# Patient Record
Sex: FEMALE | Race: BLACK OR AFRICAN AMERICAN | NOT HISPANIC OR LATINO | URBAN - METROPOLITAN AREA
[De-identification: names, ages, dates, MRNs, and addresses within clinical notes are randomized per-mention and may not be internally consistent; named-entity substitution may affect disease eponyms.]

---

## 2018-04-28 ENCOUNTER — EMERGENCY (EMERGENCY)
Facility: HOSPITAL | Age: 43
LOS: 1 days | Discharge: HOME | End: 2018-04-28
Attending: EMERGENCY MEDICINE
Payer: COMMERCIAL

## 2018-04-28 VITALS
WEIGHT: 139.99 LBS | DIASTOLIC BLOOD PRESSURE: 84 MMHG | SYSTOLIC BLOOD PRESSURE: 122 MMHG | RESPIRATION RATE: 19 BRPM | HEIGHT: 63 IN | HEART RATE: 115 BPM | OXYGEN SATURATION: 98 % | TEMPERATURE: 99 F

## 2018-04-28 PROCEDURE — 99283 EMERGENCY DEPT VISIT LOW MDM: CPT | Mod: 25

## 2018-04-28 PROCEDURE — 99285 EMERGENCY DEPT VISIT HI MDM: CPT

## 2018-04-28 PROCEDURE — 99053 MED SERV 10PM-8AM 24 HR FAC: CPT

## 2018-04-28 PROCEDURE — 82962 GLUCOSE BLOOD TEST: CPT

## 2018-04-28 RX ORDER — MIRTAZAPINE 45 MG/1
0 TABLET, ORALLY DISINTEGRATING ORAL
Qty: 0 | Refills: 0 | COMMUNITY

## 2018-04-28 RX ORDER — ZOLPIDEM TARTRATE 10 MG/1
0 TABLET ORAL
Qty: 0 | Refills: 0 | COMMUNITY

## 2018-04-28 RX ORDER — LURASIDONE HYDROCHLORIDE 40 MG/1
0 TABLET ORAL
Qty: 0 | Refills: 0 | COMMUNITY

## 2018-04-28 NOTE — ED ADULT NURSE NOTE - DISCHARGE TEACHING
pt.under arrest badge # [id number] re-eval  by attending  md with  order  to d/c  home.left  in the ed in stable. condition.not  in distress

## 2018-04-28 NOTE — ED PROVIDER NOTE - OBJECTIVE STATEMENT
42 y/o F pt w/ no significant PMHx presents to the ED c/o alcohol intoxication. Pt BIB  from [facility]. Pt eating comfortably in the stretcher. Pt was drinking coors light. Denies

## 2018-04-29 VITALS
RESPIRATION RATE: 20 BRPM | OXYGEN SATURATION: 99 % | HEART RATE: 100 BPM | SYSTOLIC BLOOD PRESSURE: 121 MMHG | DIASTOLIC BLOOD PRESSURE: 77 MMHG | TEMPERATURE: 99 F

## 2021-07-22 NOTE — ED ADULT NURSE NOTE - NS ED NOTE  TALK SOMEONE YN
TRANSFER - OUT REPORT:    Verbal report given to Nicole Freitas RN(name) on Amirah Turner  being transferred to [facility](No